# Patient Record
Sex: FEMALE | Race: WHITE | Employment: FULL TIME
[De-identification: names, ages, dates, MRNs, and addresses within clinical notes are randomized per-mention and may not be internally consistent; named-entity substitution may affect disease eponyms.]

---

## 2024-10-14 ENCOUNTER — TELEPHONE (OUTPATIENT)
Dept: FAMILY MEDICINE CLINIC | Facility: CLINIC | Age: 29
End: 2024-10-14

## 2024-10-14 NOTE — TELEPHONE ENCOUNTER
Dfm:    Welcome to [facility]! We can't wait to meet you!          MD Raudel Velasco MD Karla Lester, PA-C Kaitlin Eckley, PA-C     Our care team are here to serve you.    Our office is conveniently located at: [address] [address].    If you have any questions prior to your appointment, please contact our practice manager, Luciana Flores, at [phone].    Sincerely,     [facility]    Stay in touch… Get convenient, quality care at your fingertips!  We know you are busy; use [employer] to manage your health care tasks on your schedule. With our convenient [employer] tommy, you can:    Request or schedule an appointment  Request prescription refills  Communicate with your [facility] provider  Get test results  View health information    Remember to complete the [employer] Visit Pre-Check before your appointment.  It is quick, easy, and will speed up your check-in process when you come to the office.     For more information about our complete line of services within our [facility] system, please visit [facility].  We appreciate you trusting us with your care

## 2024-11-05 SDOH — ECONOMIC STABILITY: FOOD INSECURITY: WITHIN THE PAST 12 MONTHS, YOU WORRIED THAT YOUR FOOD WOULD RUN OUT BEFORE YOU GOT MONEY TO BUY MORE.: NEVER TRUE

## 2024-11-05 SDOH — ECONOMIC STABILITY: FOOD INSECURITY: WITHIN THE PAST 12 MONTHS, THE FOOD YOU BOUGHT JUST DIDN'T LAST AND YOU DIDN'T HAVE MONEY TO GET MORE.: NEVER TRUE

## 2024-11-05 SDOH — ECONOMIC STABILITY: TRANSPORTATION INSECURITY
IN THE PAST 12 MONTHS, HAS LACK OF TRANSPORTATION KEPT YOU FROM MEETINGS, WORK, OR FROM GETTING THINGS NEEDED FOR DAILY LIVING?: YES

## 2024-11-05 SDOH — ECONOMIC STABILITY: INCOME INSECURITY: HOW HARD IS IT FOR YOU TO PAY FOR THE VERY BASICS LIKE FOOD, HOUSING, MEDICAL CARE, AND HEATING?: NOT HARD AT ALL

## 2024-11-08 ENCOUNTER — OFFICE VISIT (OUTPATIENT)
Dept: OBGYN CLINIC | Age: 29
End: 2024-11-08

## 2024-11-08 VITALS
SYSTOLIC BLOOD PRESSURE: 134 MMHG | DIASTOLIC BLOOD PRESSURE: 80 MMHG | WEIGHT: 261 LBS | HEIGHT: 64 IN | BODY MASS INDEX: 44.56 KG/M2

## 2024-11-08 DIAGNOSIS — Z01.419 WELL WOMAN EXAM WITH ROUTINE GYNECOLOGICAL EXAM: Primary | ICD-10-CM

## 2024-11-08 DIAGNOSIS — Z12.4 SCREENING FOR CERVICAL CANCER: ICD-10-CM

## 2024-11-08 DIAGNOSIS — Z11.3 SCREEN FOR STD (SEXUALLY TRANSMITTED DISEASE): ICD-10-CM

## 2024-11-08 RX ORDER — TIRZEPATIDE 7.5 MG/.5ML
INJECTION, SOLUTION SUBCUTANEOUS
COMMUNITY

## 2024-11-08 NOTE — PROGRESS NOTES
Patient presents today for   Chief Complaint   Patient presents with    Annual Exam    New Patient       Menses: Onset:  10 years old. Interval: irregular days. Duration: 8-10 days. Flow: Heavy  LMP: Patient's last menstrual period was 09/29/2024.  Contraception: none        No Known Allergies  Current Outpatient Medications   Medication Sig Dispense Refill    ATORVASTATIN CALCIUM PO       MOUNJARO 7.5 MG/0.5ML SOAJ        No current facility-administered medications for this visit.     Past Medical History:   Diagnosis Date    Kidney stone     Migraine     PCOS (polycystic ovarian syndrome)     Type 2 diabetes mellitus without complication (HCC)      Past Surgical History:   Procedure Laterality Date    COSMETIC SURGERY  2022    monsplasty    TONSILLECTOMY       Social History     Socioeconomic History    Marital status: Single     Spouse name: Not on file    Number of children: Not on file    Years of education: Not on file    Highest education level: Not on file   Occupational History    Not on file   Tobacco Use    Smoking status: Never     Passive exposure: Never    Smokeless tobacco: Never   Vaping Use    Vaping status: Never Used   Substance and Sexual Activity    Alcohol use: Never    Drug use: Never    Sexual activity: Yes     Partners: Male     Birth control/protection: None   Other Topics Concern    Not on file   Social History Narrative    Not on file     Social Determinants of Health     Financial Resource Strain: Not on file   Food Insecurity: Not on file (11/5/2024)   Transportation Needs: Not on file   Physical Activity: Inactive (11/19/2024)    Exercise Vital Sign     Days of Exercise per Week: 0 days     Minutes of Exercise per Session: 0 min   Stress: Not on file   Social Connections: Not on file   Intimate Partner Violence: Not on file   Housing Stability: High Risk (11/5/2024)    Housing Stability Vital Sign     Unable to Pay for Housing in the Last Year: Not on file     Number of Times Moved in

## 2024-11-12 RX ORDER — FLUCONAZOLE 150 MG/1
150 TABLET ORAL
Qty: 2 TABLET | Refills: 0 | Status: SHIPPED | OUTPATIENT
Start: 2024-11-12 | End: 2024-11-18

## 2024-11-18 LAB
C TRACH RRNA CVX QL NAA+PROBE: NEGATIVE
COLLECTION METHOD: ABNORMAL
CYTOLOGIST CVX/VAG CYTO: ABNORMAL
CYTOLOGY CVX/VAG DOC THIN PREP: ABNORMAL
DATE OF LMP: ABNORMAL
HPV REFLEX: ABNORMAL
Lab: ABNORMAL
N GONORRHOEA RRNA CVX QL NAA+PROBE: NEGATIVE
OTHER PT INFO: ABNORMAL
PAP SOURCE: ABNORMAL
PATH REPORT.FINAL DX SPEC: ABNORMAL
PATHOLOGIST CVX/VAG CYTO: ABNORMAL
PATHOLOGIST PROVIDED ICD: ABNORMAL
PREV CYTO INFO: NEGATIVE
PREV TREATMENT RESULTS: ABNORMAL
PREV TREATMENT: ABNORMAL
STAT OF ADQ CVX/VAG CYTO-IMP: ABNORMAL
T VAGINALIS RRNA SPEC QL NAA+PROBE: NEGATIVE

## 2024-12-27 DIAGNOSIS — N89.8 VAGINAL ITCHING: ICD-10-CM

## 2024-12-27 DIAGNOSIS — N89.8 VAGINAL DISCHARGE: Primary | ICD-10-CM

## 2024-12-27 RX ORDER — FLUCONAZOLE 150 MG/1
150 TABLET ORAL
Qty: 2 TABLET | Refills: 0 | Status: SHIPPED | OUTPATIENT
Start: 2024-12-27 | End: 2025-01-02

## 2024-12-27 NOTE — PROGRESS NOTES
Patient reports symptoms of a yeast infection after completing a round of diflucan. Repeat rx sent in. Patient notified that if symptoms are ongoing she will need an office visit.

## 2025-02-10 ENCOUNTER — TELEPHONE (OUTPATIENT)
Dept: OBGYN CLINIC | Age: 30
End: 2025-02-10

## 2025-02-10 NOTE — TELEPHONE ENCOUNTER
Patient calling has appt. This Friday for ultrasound and to see Dr. García.  Patient also has appt. For Colpo next Friday 2/21/25.  She is having trouble getting out of work this Friday and wants to cancel the ultrasound and MD appt.  Can she still have colpo on 2/21 without these appt.'s or will she have to reschedule both?    Please call patient

## 2025-02-21 ENCOUNTER — PROCEDURE VISIT (OUTPATIENT)
Dept: OBGYN CLINIC | Age: 30
End: 2025-02-21
Payer: COMMERCIAL

## 2025-02-21 VITALS
WEIGHT: 260 LBS | SYSTOLIC BLOOD PRESSURE: 120 MMHG | HEIGHT: 64 IN | DIASTOLIC BLOOD PRESSURE: 80 MMHG | BODY MASS INDEX: 44.39 KG/M2

## 2025-02-21 DIAGNOSIS — Z01.812 PRE-PROCEDURE LAB EXAM: ICD-10-CM

## 2025-02-21 DIAGNOSIS — R87.612 LGSIL ON PAP SMEAR OF CERVIX: Primary | ICD-10-CM

## 2025-02-21 LAB
HCG, PREGNANCY, URINE, POC: NEGATIVE
VALID INTERNAL CONTROL, POC: YES

## 2025-02-21 PROCEDURE — 57454 BX/CURETT OF CERVIX W/SCOPE: CPT | Performed by: OBSTETRICS & GYNECOLOGY

## 2025-02-21 PROCEDURE — 81025 URINE PREGNANCY TEST: CPT | Performed by: OBSTETRICS & GYNECOLOGY

## 2025-02-21 RX ORDER — NYSTATIN 100000 U/G
CREAM TOPICAL
COMMUNITY
Start: 2025-01-11

## 2025-02-21 RX ORDER — CLOBETASOL PROPIONATE 0.5 MG/G
OINTMENT TOPICAL
Qty: 45 G | Refills: 3 | Status: SHIPPED | OUTPATIENT
Start: 2025-02-21

## 2025-02-21 RX ORDER — SEMAGLUTIDE 1.34 MG/ML
INJECTION, SOLUTION SUBCUTANEOUS
COMMUNITY
Start: 2024-12-26

## 2025-02-21 RX ORDER — RIMEGEPANT SULFATE 75 MG/75MG
TABLET, ORALLY DISINTEGRATING ORAL
COMMUNITY
Start: 2025-01-12

## 2025-02-21 NOTE — PROGRESS NOTES
Chief Complaint   Patient presents with    Colposcopy       Pap: LGSIL 11/8/2024    Vitals:    02/21/25 1106   BP: 120/80         2/28/2025    10:50 AM 2/21/2025    11:06 AM 11/8/2024    10:52 AM   Weight Metrics   Weight 258 lb 260 lb 261 lb   BMI (Calculated) 44.4 kg/m2 44.7 kg/m2 44.9 kg/m2     Review of Systems   Constitutional: Negative.    HENT: Negative.     Eyes: Negative.    Respiratory: Negative.     Cardiovascular: Negative.    Gastrointestinal: Negative.    Endocrine: Negative.    Genitourinary:         Rash of labia   Musculoskeletal: Negative.    Allergic/Immunologic: Negative.    Neurological: Negative.    Hematological: Negative.    Psychiatric/Behavioral: Negative.       Physical Exam  Vitals signs reviewed.   Constitutional:       General: She is not in acute distress.     Appearance: Normal appearance. She is well-developed. She is not ill-appearing, toxic-appearing or diaphoretic.   HENT:      Head: Normocephalic and atraumatic.      Nose: Nose normal.   Eyes:      General: No scleral icterus.     Conjunctiva/sclera: Conjunctivae normal.      Pupils: Pupils are equal, round, and reactive to light.   Neck:      Musculoskeletal: Normal range of motion. No acute abnormality  Pulmonary:      Effort: Pulmonary effort is normal. No respiratory distress.   Abdominal:      No distension.      Palpations: Abdomen is soft. There is no mass.      Tenderness: There is no abdominal tenderness. There is no guarding or rebound.   Genitourinary:     Labia: Rash present over BL labia majora - red, pruritic, did not improve with yeast Tx     Right: No rash, tenderness, lesion or injury.         Left: No rash, tenderness, lesion or injury.       Vagina: Normal. No signs of injury and foreign body. No vaginal discharge, erythema, tenderness or bleeding.      Cervix: No cervical motion tenderness, discharge or friability.   Musculoskeletal: Normal range of motion.         General: No tenderness.   Skin:     General:

## 2025-02-28 ENCOUNTER — OFFICE VISIT (OUTPATIENT)
Dept: OBGYN CLINIC | Age: 30
End: 2025-02-28

## 2025-02-28 VITALS
WEIGHT: 258 LBS | SYSTOLIC BLOOD PRESSURE: 134 MMHG | HEIGHT: 64 IN | DIASTOLIC BLOOD PRESSURE: 86 MMHG | BODY MASS INDEX: 44.05 KG/M2

## 2025-02-28 DIAGNOSIS — R10.2 PELVIC CRAMPING: ICD-10-CM

## 2025-02-28 DIAGNOSIS — N92.0 MENORRHAGIA WITH REGULAR CYCLE: ICD-10-CM

## 2025-02-28 DIAGNOSIS — Z98.890 HISTORY OF COLPOSCOPY WITH CERVICAL BIOPSY: ICD-10-CM

## 2025-02-28 DIAGNOSIS — N93.9 ABNORMAL UTERINE BLEEDING (AUB): ICD-10-CM

## 2025-02-28 DIAGNOSIS — N87.0 DYSPLASIA OF CERVIX, LOW GRADE (CIN 1): Primary | ICD-10-CM

## 2025-02-28 DIAGNOSIS — R87.612 LGSIL ON PAP SMEAR OF CERVIX: ICD-10-CM

## 2025-02-28 RX ORDER — TRANEXAMIC ACID 650 MG/1
1300 TABLET ORAL 3 TIMES DAILY PRN
Qty: 30 TABLET | Refills: 0 | Status: SHIPPED | OUTPATIENT
Start: 2025-02-28

## 2025-02-28 SDOH — ECONOMIC STABILITY: FOOD INSECURITY: WITHIN THE PAST 12 MONTHS, YOU WORRIED THAT YOUR FOOD WOULD RUN OUT BEFORE YOU GOT MONEY TO BUY MORE.: NEVER TRUE

## 2025-02-28 SDOH — ECONOMIC STABILITY: FOOD INSECURITY: WITHIN THE PAST 12 MONTHS, THE FOOD YOU BOUGHT JUST DIDN'T LAST AND YOU DIDN'T HAVE MONEY TO GET MORE.: NEVER TRUE

## 2025-02-28 NOTE — PROGRESS NOTES
Normocephalic and atraumatic.      Nose: Nose normal.   Eyes:      General: No scleral icterus.     Conjunctiva/sclera: Conjunctivae normal.      Pupils: Pupils are equal, round, and reactive to light.   Neck:      Musculoskeletal: Normal range of motion. No acute abnormality  Pulmonary:      Effort: Pulmonary effort is normal. No respiratory distress.   Abdominal:      No distension.      Palpations: Abdomen is soft. There is no mass.      Tenderness: There is no abdominal tenderness. There is no guarding or rebound.   Genitourinary:     Labia:         Right: No rash, tenderness, lesion or injury.         Left: No rash, tenderness, lesion or injury.       Vagina: Normal. No signs of injury and foreign body. No vaginal discharge, erythema, tenderness. + moderate bleeding.     Cervix: No cervical motion tenderness, discharge or friability.      Comments: External genitalia are normal in appearance. Visual examination of anus and perineum shows no abnormalities.  Musculoskeletal: Normal range of motion.         General: No tenderness.   Skin:     General: Skin is warm and dry.      Coloration: Skin is not pale.      Findings: No erythema or rash.   Neurological:      Mental Status: She is alert and oriented to person, place, and time.      Cranial Nerves: No cranial nerve deficit.      Motor: No abnormal muscle tone.      Coordination: Coordination normal.   Psychiatric:         Behavior: Behavior normal.         Thought Content: Thought content normal.         Judgment: Judgment normal.              1. Dysplasia of cervix, low grade (SHARATH 1)    2. History of colposcopy with cervical biopsy    3. Pelvic cramping    4. Abnormal uterine bleeding (AUB)    5. LGSIL on Pap smear of cervix    6. Menorrhagia with regular cycle      No orders of the defined types were placed in this encounter.    Orders Placed This Encounter   Medications    tranexamic acid (LYSTEDA) 650 MG TABS tablet     Sig: Take 2 tablets by mouth 3 times

## 2025-03-10 ASSESSMENT — ENCOUNTER SYMPTOMS
ALLERGIC/IMMUNOLOGIC NEGATIVE: 1
EYES NEGATIVE: 1
GASTROINTESTINAL NEGATIVE: 1
RESPIRATORY NEGATIVE: 1

## 2025-03-24 ENCOUNTER — PATIENT MESSAGE (OUTPATIENT)
Dept: OBGYN CLINIC | Age: 30
End: 2025-03-24

## 2025-03-24 DIAGNOSIS — N89.8 VAGINAL DISCHARGE: Primary | ICD-10-CM

## 2025-03-24 DIAGNOSIS — N89.8 VAGINAL ITCHING: ICD-10-CM

## 2025-03-24 RX ORDER — METRONIDAZOLE 500 MG/1
500 TABLET ORAL 2 TIMES DAILY
Qty: 14 TABLET | Refills: 0 | Status: SHIPPED | OUTPATIENT
Start: 2025-03-24 | End: 2025-03-31

## 2025-05-09 ENCOUNTER — OFFICE VISIT (OUTPATIENT)
Dept: OBGYN CLINIC | Age: 30
End: 2025-05-09

## 2025-05-09 ENCOUNTER — PROCEDURE VISIT (OUTPATIENT)
Dept: OBGYN CLINIC | Age: 30
End: 2025-05-09
Payer: COMMERCIAL

## 2025-05-09 VITALS
HEIGHT: 64 IN | BODY MASS INDEX: 44.56 KG/M2 | WEIGHT: 261 LBS | SYSTOLIC BLOOD PRESSURE: 122 MMHG | DIASTOLIC BLOOD PRESSURE: 82 MMHG

## 2025-05-09 DIAGNOSIS — N92.0 MENORRHAGIA WITH REGULAR CYCLE: ICD-10-CM

## 2025-05-09 DIAGNOSIS — E28.2 PCOS (POLYCYSTIC OVARIAN SYNDROME): ICD-10-CM

## 2025-05-09 DIAGNOSIS — N93.9 ABNORMAL UTERINE BLEEDING (AUB): Primary | ICD-10-CM

## 2025-05-09 DIAGNOSIS — R10.2 PELVIC CRAMPING: ICD-10-CM

## 2025-05-09 PROCEDURE — 76830 TRANSVAGINAL US NON-OB: CPT | Performed by: OBSTETRICS & GYNECOLOGY

## 2025-05-13 ENCOUNTER — PATIENT MESSAGE (OUTPATIENT)
Dept: OBGYN CLINIC | Age: 30
End: 2025-05-13

## 2025-05-27 NOTE — PROGRESS NOTES
Pt had to leave without being seen by MD after her ultrasound due to our schedule running behind. [facility].

## 2025-05-30 DIAGNOSIS — N93.9 ABNORMAL UTERINE BLEEDING (AUB): ICD-10-CM

## 2025-05-30 DIAGNOSIS — N92.0 MENORRHAGIA WITH REGULAR CYCLE: ICD-10-CM

## 2025-06-02 RX ORDER — TRANEXAMIC ACID 650 MG/1
1300 TABLET ORAL 3 TIMES DAILY PRN
Qty: 30 TABLET | Refills: 0 | Status: SHIPPED | OUTPATIENT
Start: 2025-06-02

## 2025-06-11 ENCOUNTER — OFFICE VISIT (OUTPATIENT)
Dept: OBGYN CLINIC | Age: 30
End: 2025-06-11
Payer: COMMERCIAL

## 2025-06-11 VITALS
HEIGHT: 64 IN | BODY MASS INDEX: 44.22 KG/M2 | SYSTOLIC BLOOD PRESSURE: 124 MMHG | WEIGHT: 259 LBS | DIASTOLIC BLOOD PRESSURE: 80 MMHG

## 2025-06-11 DIAGNOSIS — N93.0 POSTCOITAL BLEEDING: Primary | ICD-10-CM

## 2025-06-11 DIAGNOSIS — N93.9 ABNORMAL UTERINE BLEEDING (AUB): ICD-10-CM

## 2025-06-11 DIAGNOSIS — N92.1 MENORRHAGIA WITH IRREGULAR CYCLE: ICD-10-CM

## 2025-06-11 DIAGNOSIS — N94.6 DYSMENORRHEA: ICD-10-CM

## 2025-06-11 PROCEDURE — 99213 OFFICE O/P EST LOW 20 MIN: CPT | Performed by: NURSE PRACTITIONER

## 2025-06-11 NOTE — PROGRESS NOTES
CC:   Chief Complaint   Patient presents with    Other     Bleeding after intercourse       HPI:    Jose Guadalupe  is a 29 y.o. , , Patient's last menstrual period was 2025 (approximate).,  who is seen today due to post coital vaginal bleeding.     The patient is patient of Dr. García with last visit on 2025. She was also seen for ultrasound on May 9th however, was unable to stay to see Dr. García.    U/s findings 2025: Anteverted, Enlarged Heterogeneous UT (V=100.593)  Endometrium=12.5mm, fluid movement seen within.   Hyperechoic mass with blood flow in cervix measuring 1.1 x 0.6 x 0.8cm, probable polyp. Appears 2.6cm away from external os and surrounded by several Nabothian cysts.   Right ovary-Appears polycystic. 1 simple cyst measuring 3.5 x 2.1 x 2.5cm. Avascular (V=35.209)  Left ovary-appears polycystic (V=15.150)  No free fluid seen  Bilateral adnexas appear wnl      The patient states she started experiencing post coital vaginal bleeding 2-3 weeks ago. She reports experiencing with every encounter. States post coital vaginal bleeding will last 1-4 days. She reports having to wear super tampons changing every 2-3 hours.   She denies noticing bleeding on sheets  Denies changes in positions assist  She denies aggressive or forceful encounters.     She reports most recent menses which started around May 6th (2nd week of May) lasted 11-15 days with flow as follows:     Days 1 and 3: Light flows, wears super tampons, changing every 2-3 hours.  Days 3-8: Heavy Flow, reports wearing Super plus tampon and will also wear \"Depends\". She reports changing every 1-2 hours. Will occasionally bleed onto clothing. Experiences severe dysmenorrhea for which she takes OTC meds as needed.   Days 9-11: Light flow, reports wearing super tampons, changing every 2-3 hours.   Days 12-15: Light flow, reports wearing super tampons, changing every 2-3 hours.     The patient states bleeding will stop for 1-2

## 2025-06-19 ENCOUNTER — TELEPHONE (OUTPATIENT)
Dept: OBGYN CLINIC | Age: 30
End: 2025-06-19

## 2025-06-19 DIAGNOSIS — E28.2 POLYCYSTIC OVARIES: ICD-10-CM

## 2025-06-19 DIAGNOSIS — N84.1 CERVICAL POLYP: Primary | ICD-10-CM

## 2025-06-19 DIAGNOSIS — N85.2 ENLARGED UTERUS: ICD-10-CM

## 2025-06-19 DIAGNOSIS — N93.0 POSTCOITAL BLEEDING: ICD-10-CM

## 2025-06-19 DIAGNOSIS — R87.612 LGSIL ON PAP SMEAR OF CERVIX: ICD-10-CM

## 2025-06-19 DIAGNOSIS — R93.89 ENDOMETRIAL THICKENING ON ULTRASOUND: ICD-10-CM

## 2025-06-19 DIAGNOSIS — N87.0 DYSPLASIA OF CERVIX, LOW GRADE (CIN 1): ICD-10-CM

## 2025-06-19 NOTE — TELEPHONE ENCOUNTER
Called pt to discuss her ultrasound results from last appt.     Ultrasound 5/9/25:  See scanned report for full details.   All images viewed, read and interpreted by this MD.  Anteverted enlarged uterus with 13mm EM.  3cm simple RO cyst, polycystic appearing ovaries BL.   No FF.  1.1cm polyp within the cervix noted, approx 2.6cm above the external os.   Impression/Interpretation: Enlarged uterus with probable cervical polyp and simple right ovarian cyst, overall polycystic appearing ovaries.   Plan: See discussion below, plan surgical eval & Tx with hysteroscopy D&C.  Ciera García MD FACOG      Discussed these findings as interpreted above. Since the ultrasound appt, Jose Guadalupe has developed post-coital bleeding with every time she has intercourse. The polyp seems the most likely answer. Options for removal in office versus more thorough eval in the OR with hysteroscopy, dilation & curettage with polypectomy. She did have an abnormal pap that was low grade and followed with a colposcopy showing low grade dysplasia at multiple biopsy sites. It would make sense to reassess this whiel in the OR as well. Pt requested as soon as possible given that it has been occurring every time she has sex. Will send surgical scheduling request for asap.    1. Cervical polyp    2. Enlarged uterus    3. Polycystic ovaries    4. Endometrial thickening on ultrasound    5. Postcoital bleeding    6. LGSIL on Pap smear of cervix    7. Dysplasia of cervix, low grade (SHARATH 1)

## 2025-06-19 NOTE — TELEPHONE ENCOUNTER
Name: Jose Guadalupe Hall     : 1995    Physicians Information    Recommended Surgery: Hysteroscopy, Dilation and Curettage, polypectomy with myosure, and Colposcopy (if possible)  Place: [facility]   When: TBD  Diagnosis:   Diagnosis Orders   1. Cervical polyp        2. Enlarged uterus        3. Polycystic ovaries        4. Endometrial thickening on ultrasound        5. Postcoital bleeding        6. LGSIL on Pap smear of cervix        7. Dysplasia of cervix, low grade (SHARATH 1)          Time Needed:  Surgeon:Ciera García MD  Assistant Needed: aNti Carmona CST  Special Request:    Surgery Department Information    Date Scheduled: _____________________ Hospital Pre-Op:______________________        Time Scheduled : ____________________ Surgery Entered: _____________________    Facility: ____________________________ Patient Notified: ______________________    Pre-Op: _______________________ Orders Completed: ___________________

## 2025-06-20 ENCOUNTER — TELEPHONE (OUTPATIENT)
Dept: OBGYN CLINIC | Age: 30
End: 2025-06-20

## 2025-06-20 ENCOUNTER — PREP FOR PROCEDURE (OUTPATIENT)
Dept: OBGYN CLINIC | Age: 30
End: 2025-06-20

## 2025-06-20 DIAGNOSIS — R93.89 THICKENED ENDOMETRIUM: ICD-10-CM

## 2025-06-20 DIAGNOSIS — N84.1 POLYP OF CERVIX: ICD-10-CM

## 2025-06-20 DIAGNOSIS — N93.0 POSTCOITAL BLEEDING: ICD-10-CM

## 2025-06-20 DIAGNOSIS — N85.2 ENLARGED UTERUS: ICD-10-CM

## 2025-06-20 DIAGNOSIS — N87.0 CIN I (CERVICAL INTRAEPITHELIAL NEOPLASIA I): ICD-10-CM

## 2025-06-20 DIAGNOSIS — R87.612 LOW-GRADE SQUAMOUS INTRAEPITHELIAL LESION OF CERVIX OF UNDETERMINED SIGNIFICANCE: ICD-10-CM

## 2025-06-20 NOTE — TELEPHONE ENCOUNTER
Attempted to contact patient to discuss possible dates for recommended surgery per Dr García (Hyst D&C with Myosure possible Colposcopy). N/A, L/M on voicemail for patient to return my call at her earliest convenience.

## 2025-06-23 RX ORDER — LISINOPRIL 2.5 MG/1
2.5 TABLET ORAL DAILY
COMMUNITY

## 2025-06-23 NOTE — PROGRESS NOTES
Patient verified name and .  Order for consent  was not found in EHR   Type 1b surgery, Phone assessment complete.  Orders not received.  Labs per surgeon: none  Labs per anesthesia protocol: none    Patient answered medical/surgical history questions at their best of ability. All prior to admission medications documented in EPIC.    Patient instructed to continue taking all prescription medications up to the day of surgery but to take only the following medications the day of surgery according to anesthesia guidelines with a small sip of water: ATORVASTATIN.  Also, patient is requested to take 2 Tylenol in the morning and then again before bed on the day before surgery. Regular or extra strength may be used.       Patient informed that all vitamins and supplements should be held 7 days prior to surgery and NSAIDS 5 days prior to surgery. Prescription meds to hold:NONE    Patient instructed on the following:    > Arrive at [facility] A Entrance, time of arrival to be called the day before by 1700  > No food after midnight, patient may drink clear liquids up until 2 hours prior to arrival. No gum, candy, mints.   > Responsible adult must drive patient to the hospital, stay during surgery, and patient will need supervision 24 hours after anesthesia  > Use non moisturizing soap in shower the night before surgery and on the morning of surgery  > All piercings must be removed prior to arrival.    > Leave all valuables (money and jewelry) at home but bring insurance card and ID on DOS.   > You may be required to pay a deductible or co-pay on the day of your procedure. You can pre-pay by calling [phone] if your surgery is at the [facility] or [phone] if your surgery is at the [facility].  > Do not wear make-up, nail polish, lotions, cologne, perfumes, powders, or oil on skin. Artificial nails are not permitted.

## 2025-07-07 ENCOUNTER — PREP FOR PROCEDURE (OUTPATIENT)
Dept: OBGYN CLINIC | Age: 30
End: 2025-07-07

## 2025-07-07 DIAGNOSIS — N85.2 ENLARGED UTERUS: ICD-10-CM

## 2025-07-07 DIAGNOSIS — N87.0 CIN I (CERVICAL INTRAEPITHELIAL NEOPLASIA I): ICD-10-CM

## 2025-07-07 DIAGNOSIS — N93.9 ABNORMAL UTERINE BLEEDING (AUB): ICD-10-CM

## 2025-07-07 DIAGNOSIS — R93.89 THICKENED ENDOMETRIUM: ICD-10-CM

## 2025-07-07 DIAGNOSIS — N92.1 MENORRHAGIA WITH IRREGULAR CYCLE: ICD-10-CM

## 2025-07-07 DIAGNOSIS — N94.6 DYSMENORRHEA: ICD-10-CM

## 2025-07-07 DIAGNOSIS — N93.0 POSTCOITAL BLEEDING: Primary | ICD-10-CM

## 2025-07-07 RX ORDER — SODIUM CHLORIDE 0.9 % (FLUSH) 0.9 %
5-40 SYRINGE (ML) INJECTION EVERY 12 HOURS SCHEDULED
Status: CANCELLED | OUTPATIENT
Start: 2025-07-08

## 2025-07-07 RX ORDER — SODIUM CHLORIDE 9 MG/ML
INJECTION, SOLUTION INTRAVENOUS PRN
Status: CANCELLED | OUTPATIENT
Start: 2025-07-07

## 2025-07-07 RX ORDER — SODIUM CHLORIDE 0.9 % (FLUSH) 0.9 %
5-40 SYRINGE (ML) INJECTION PRN
Status: CANCELLED | OUTPATIENT
Start: 2025-07-07

## 2025-07-08 ENCOUNTER — ANESTHESIA EVENT (OUTPATIENT)
Dept: SURGERY | Age: 30
End: 2025-07-08
Payer: COMMERCIAL

## 2025-07-08 ENCOUNTER — HOSPITAL ENCOUNTER (OUTPATIENT)
Age: 30
Discharge: HOME | End: 2025-07-08
Attending: OBSTETRICS & GYNECOLOGY | Admitting: OBSTETRICS & GYNECOLOGY
Payer: COMMERCIAL

## 2025-07-08 ENCOUNTER — ANESTHESIA (OUTPATIENT)
Dept: SURGERY | Age: 30
End: 2025-07-08
Payer: COMMERCIAL

## 2025-07-08 VITALS
BODY MASS INDEX: 44.44 KG/M2 | HEIGHT: 64 IN | RESPIRATION RATE: 15 BRPM | HEART RATE: 90 BPM | WEIGHT: 260.3 LBS | DIASTOLIC BLOOD PRESSURE: 89 MMHG | TEMPERATURE: 98 F | OXYGEN SATURATION: 97 % | SYSTOLIC BLOOD PRESSURE: 146 MMHG

## 2025-07-08 DIAGNOSIS — Z98.890 POST-OPERATIVE STATE: Primary | ICD-10-CM

## 2025-07-08 LAB
GLUCOSE BLD STRIP.AUTO-MCNC: 153 MG/DL (ref 65–100)
HCG UR QL: NEGATIVE
HGB BLD-MCNC: 14.3 G/DL (ref 11.7–15.4)
SERVICE CMNT-IMP: ABNORMAL

## 2025-07-08 PROCEDURE — 88305 TISSUE EXAM BY PATHOLOGIST: CPT

## 2025-07-08 PROCEDURE — 82962 GLUCOSE BLOOD TEST: CPT

## 2025-07-08 PROCEDURE — 7100000010 HC PHASE II RECOVERY - FIRST 15 MIN: Performed by: OBSTETRICS & GYNECOLOGY

## 2025-07-08 PROCEDURE — 3600000013 HC SURGERY LEVEL 3 ADDTL 15MIN: Performed by: OBSTETRICS & GYNECOLOGY

## 2025-07-08 PROCEDURE — 7100000000 HC PACU RECOVERY - FIRST 15 MIN: Performed by: OBSTETRICS & GYNECOLOGY

## 2025-07-08 PROCEDURE — 85018 HEMOGLOBIN: CPT

## 2025-07-08 PROCEDURE — 3700000000 HC ANESTHESIA ATTENDED CARE: Performed by: OBSTETRICS & GYNECOLOGY

## 2025-07-08 PROCEDURE — 3600000003 HC SURGERY LEVEL 3 BASE: Performed by: OBSTETRICS & GYNECOLOGY

## 2025-07-08 PROCEDURE — 81025 URINE PREGNANCY TEST: CPT

## 2025-07-08 PROCEDURE — 7100000001 HC PACU RECOVERY - ADDTL 15 MIN: Performed by: OBSTETRICS & GYNECOLOGY

## 2025-07-08 PROCEDURE — 2500000003 HC RX 250 WO HCPCS: Performed by: NURSE ANESTHETIST, CERTIFIED REGISTERED

## 2025-07-08 PROCEDURE — 7100000011 HC PHASE II RECOVERY - ADDTL 15 MIN: Performed by: OBSTETRICS & GYNECOLOGY

## 2025-07-08 PROCEDURE — 2580000003 HC RX 258: Performed by: ANESTHESIOLOGY

## 2025-07-08 PROCEDURE — 6360000002 HC RX W HCPCS: Performed by: ANESTHESIOLOGY

## 2025-07-08 PROCEDURE — 2720000010 HC SURG SUPPLY STERILE: Performed by: OBSTETRICS & GYNECOLOGY

## 2025-07-08 PROCEDURE — 3700000001 HC ADD 15 MINUTES (ANESTHESIA): Performed by: OBSTETRICS & GYNECOLOGY

## 2025-07-08 PROCEDURE — 6360000002 HC RX W HCPCS: Performed by: NURSE ANESTHETIST, CERTIFIED REGISTERED

## 2025-07-08 PROCEDURE — 2709999900 HC NON-CHARGEABLE SUPPLY: Performed by: OBSTETRICS & GYNECOLOGY

## 2025-07-08 PROCEDURE — 6370000000 HC RX 637 (ALT 250 FOR IP): Performed by: ANESTHESIOLOGY

## 2025-07-08 PROCEDURE — 2580000003 HC RX 258: Performed by: NURSE ANESTHETIST, CERTIFIED REGISTERED

## 2025-07-08 RX ORDER — DEXAMETHASONE SODIUM PHOSPHATE 4 MG/ML
INJECTION, SOLUTION INTRA-ARTICULAR; INTRALESIONAL; INTRAMUSCULAR; INTRAVENOUS; SOFT TISSUE
Status: DISCONTINUED | OUTPATIENT
Start: 2025-07-08 | End: 2025-07-08 | Stop reason: SDUPTHER

## 2025-07-08 RX ORDER — MIDAZOLAM HYDROCHLORIDE 2 MG/2ML
2 INJECTION, SOLUTION INTRAMUSCULAR; INTRAVENOUS
Status: DISCONTINUED | OUTPATIENT
Start: 2025-07-08 | End: 2025-07-08 | Stop reason: HOSPADM

## 2025-07-08 RX ORDER — SUCCINYLCHOLINE CHLORIDE 20 MG/ML
INJECTION INTRAMUSCULAR; INTRAVENOUS
Status: DISCONTINUED | OUTPATIENT
Start: 2025-07-08 | End: 2025-07-08 | Stop reason: SDUPTHER

## 2025-07-08 RX ORDER — IBUPROFEN 600 MG/1
600 TABLET, FILM COATED ORAL 3 TIMES DAILY PRN
Qty: 30 TABLET | Refills: 0 | Status: SHIPPED | OUTPATIENT
Start: 2025-07-08

## 2025-07-08 RX ORDER — SODIUM CHLORIDE 0.9 % (FLUSH) 0.9 %
5-40 SYRINGE (ML) INJECTION EVERY 12 HOURS SCHEDULED
Status: DISCONTINUED | OUTPATIENT
Start: 2025-07-08 | End: 2025-07-08 | Stop reason: HOSPADM

## 2025-07-08 RX ORDER — LIDOCAINE HYDROCHLORIDE 10 MG/ML
1 INJECTION, SOLUTION INFILTRATION; PERINEURAL
Status: COMPLETED | OUTPATIENT
Start: 2025-07-08 | End: 2025-07-08

## 2025-07-08 RX ORDER — SODIUM CHLORIDE, SODIUM LACTATE, POTASSIUM CHLORIDE, CALCIUM CHLORIDE 600; 310; 30; 20 MG/100ML; MG/100ML; MG/100ML; MG/100ML
INJECTION, SOLUTION INTRAVENOUS
Status: DISCONTINUED | OUTPATIENT
Start: 2025-07-08 | End: 2025-07-08 | Stop reason: SDUPTHER

## 2025-07-08 RX ORDER — OXYCODONE HYDROCHLORIDE 5 MG/1
5 TABLET ORAL
Status: DISCONTINUED | OUTPATIENT
Start: 2025-07-08 | End: 2025-07-08 | Stop reason: HOSPADM

## 2025-07-08 RX ORDER — SODIUM CHLORIDE 9 MG/ML
INJECTION, SOLUTION INTRAVENOUS PRN
Status: DISCONTINUED | OUTPATIENT
Start: 2025-07-08 | End: 2025-07-08 | Stop reason: HOSPADM

## 2025-07-08 RX ORDER — OXYCODONE HYDROCHLORIDE 5 MG/1
5 TABLET ORAL EVERY 6 HOURS PRN
Qty: 12 TABLET | Refills: 0 | Status: SHIPPED | OUTPATIENT
Start: 2025-07-08 | End: 2025-07-11

## 2025-07-08 RX ORDER — EPHEDRINE SULFATE/0.9% NACL/PF 50 MG/5 ML
SYRINGE (ML) INTRAVENOUS
Status: DISCONTINUED | OUTPATIENT
Start: 2025-07-08 | End: 2025-07-08 | Stop reason: SDUPTHER

## 2025-07-08 RX ORDER — FENTANYL CITRATE 50 UG/ML
INJECTION, SOLUTION INTRAMUSCULAR; INTRAVENOUS
Status: DISCONTINUED | OUTPATIENT
Start: 2025-07-08 | End: 2025-07-08 | Stop reason: SDUPTHER

## 2025-07-08 RX ORDER — SODIUM CHLORIDE, SODIUM LACTATE, POTASSIUM CHLORIDE, CALCIUM CHLORIDE 600; 310; 30; 20 MG/100ML; MG/100ML; MG/100ML; MG/100ML
INJECTION, SOLUTION INTRAVENOUS CONTINUOUS
Status: DISCONTINUED | OUTPATIENT
Start: 2025-07-08 | End: 2025-07-08 | Stop reason: HOSPADM

## 2025-07-08 RX ORDER — SODIUM CHLORIDE 0.9 % (FLUSH) 0.9 %
5-40 SYRINGE (ML) INJECTION PRN
Status: DISCONTINUED | OUTPATIENT
Start: 2025-07-08 | End: 2025-07-08 | Stop reason: HOSPADM

## 2025-07-08 RX ORDER — PROCHLORPERAZINE EDISYLATE 5 MG/ML
5 INJECTION INTRAMUSCULAR; INTRAVENOUS
Status: COMPLETED | OUTPATIENT
Start: 2025-07-08 | End: 2025-07-08

## 2025-07-08 RX ORDER — ACETAMINOPHEN 500 MG
1000 TABLET ORAL ONCE
Status: COMPLETED | OUTPATIENT
Start: 2025-07-08 | End: 2025-07-08

## 2025-07-08 RX ORDER — LIDOCAINE HYDROCHLORIDE 20 MG/ML
INJECTION, SOLUTION EPIDURAL; INFILTRATION; INTRACAUDAL; PERINEURAL
Status: DISCONTINUED | OUTPATIENT
Start: 2025-07-08 | End: 2025-07-08 | Stop reason: SDUPTHER

## 2025-07-08 RX ORDER — ONDANSETRON 2 MG/ML
INJECTION INTRAMUSCULAR; INTRAVENOUS
Status: DISCONTINUED | OUTPATIENT
Start: 2025-07-08 | End: 2025-07-08 | Stop reason: SDUPTHER

## 2025-07-08 RX ORDER — PROPOFOL 10 MG/ML
INJECTION, EMULSION INTRAVENOUS
Status: DISCONTINUED | OUTPATIENT
Start: 2025-07-08 | End: 2025-07-08 | Stop reason: SDUPTHER

## 2025-07-08 RX ORDER — ROCURONIUM BROMIDE 10 MG/ML
INJECTION, SOLUTION INTRAVENOUS
Status: DISCONTINUED | OUTPATIENT
Start: 2025-07-08 | End: 2025-07-08 | Stop reason: SDUPTHER

## 2025-07-08 RX ADMIN — ROCURONIUM BROMIDE 5 MG: 10 INJECTION, SOLUTION INTRAVENOUS at 07:19

## 2025-07-08 RX ADMIN — ONDANSETRON 4 MG: 2 INJECTION INTRAMUSCULAR; INTRAVENOUS at 07:22

## 2025-07-08 RX ADMIN — SODIUM CHLORIDE, SODIUM LACTATE, POTASSIUM CHLORIDE, AND CALCIUM CHLORIDE: .6; .31; .03; .02 INJECTION, SOLUTION INTRAVENOUS at 06:08

## 2025-07-08 RX ADMIN — ACETAMINOPHEN 1000 MG: 500 TABLET, FILM COATED ORAL at 05:58

## 2025-07-08 RX ADMIN — DEXAMETHASONE SODIUM PHOSPHATE 4 MG: 4 INJECTION INTRA-ARTICULAR; INTRALESIONAL; INTRAMUSCULAR; INTRAVENOUS; SOFT TISSUE at 07:22

## 2025-07-08 RX ADMIN — Medication 160 MG: at 07:19

## 2025-07-08 RX ADMIN — FENTANYL CITRATE 50 MCG: 50 INJECTION, SOLUTION INTRAMUSCULAR; INTRAVENOUS at 07:17

## 2025-07-08 RX ADMIN — LIDOCAINE HYDROCHLORIDE 1 ML: 10 INJECTION, SOLUTION INFILTRATION; PERINEURAL at 06:08

## 2025-07-08 RX ADMIN — SODIUM CHLORIDE, SODIUM LACTATE, POTASSIUM CHLORIDE, AND CALCIUM CHLORIDE: 600; 310; 30; 20 INJECTION, SOLUTION INTRAVENOUS at 07:11

## 2025-07-08 RX ADMIN — FENTANYL CITRATE 50 MCG: 50 INJECTION, SOLUTION INTRAMUSCULAR; INTRAVENOUS at 07:14

## 2025-07-08 RX ADMIN — PROCHLORPERAZINE EDISYLATE 5 MG: 5 INJECTION INTRAMUSCULAR; INTRAVENOUS at 09:07

## 2025-07-08 RX ADMIN — Medication 5 MG: at 07:49

## 2025-07-08 RX ADMIN — LIDOCAINE HYDROCHLORIDE 60 MG: 20 INJECTION, SOLUTION EPIDURAL; INFILTRATION; INTRACAUDAL; PERINEURAL at 07:19

## 2025-07-08 RX ADMIN — PROPOFOL 200 MG: 10 INJECTION, EMULSION INTRAVENOUS at 07:19

## 2025-07-08 RX ADMIN — FENTANYL CITRATE 25 MCG: 50 INJECTION, SOLUTION INTRAMUSCULAR; INTRAVENOUS at 07:37

## 2025-07-08 ASSESSMENT — PAIN - FUNCTIONAL ASSESSMENT: PAIN_FUNCTIONAL_ASSESSMENT: 0-10

## 2025-07-08 NOTE — INTERVAL H&P NOTE
Update History & Physical    The patient's History and Physical was reviewed with the patient and I examined the patient. There was no change. The surgical site was confirmed by the patient and me.     Plan: The risks, benefits, expected outcome, and alternative to the recommended procedure have been discussed with the patient. Patient understands and wants to proceed with the procedure.     Electronically signed by Ciera García MD on 7/8/2025 at 6:54 AM

## 2025-07-08 NOTE — ANESTHESIA POSTPROCEDURE EVALUATION
Department of Anesthesiology  Postprocedure Note    Patient: Jose Guadalupe Hall  MRN: [id number]  YOB: 1995  Date of evaluation: 7/8/2025    Procedure Summary       Date: 07/08/25 Room / Location: [facility] MAIN OR 02 / [facility] MAIN OR    Anesthesia Start: 0657 Anesthesia Stop: 0825    Procedure: HYSTEROSCOPY DILATATION CURETTAGE WITH MYOSURE AND REMOVAL OF RIGHT VULVAR SKIN TAG (Vagina ) Diagnosis:       Polyp of cervix      Enlarged uterus      Thickened endometrium      Postcoital bleeding      Low-grade squamous intraepithelial lesion of cervix of undetermined significance      SHARATH I (cervical intraepithelial neoplasia I)      (Polyp of cervix [N84.1])      (Enlarged uterus [N85.2])      (Thickened endometrium [R93.89])      (Postcoital bleeding [N93.0])      (Low-grade squamous intraepithelial lesion of cervix of undetermined significance [R87.612])      (SHARATH I (cervical intraepithelial neoplasia I) [N87.0])    Surgeons: Ciera García MD Responsible Provider: Delonte Holley MD    Anesthesia Type: general ASA Status: 3            Anesthesia Type: No value filed.    Emil Phase I: Emil Score: 10    Emil Phase II:      Anesthesia Post Evaluation    Patient location during evaluation: PACU  Patient participation: complete - patient participated  Level of consciousness: awake and alert  Airway patency: patent  Nausea & Vomiting: no nausea and no vomiting  Cardiovascular status: hemodynamically stable  Respiratory status: acceptable, nonlabored ventilation and spontaneous ventilation  Hydration status: euvolemic  Comments: BP (!) 146/90   Pulse 87   Temp 97.9 °F (36.6 °C)   Resp 15   Ht 1.626 m (5' 4\")   Wt 118.1 kg (260 lb 4.8 oz)   LMP 05/06/2025 (Approximate)   SpO2 96%   BMI 44.68 kg/m²     Multimodal analgesia pain management approach  Pain management: adequate and satisfactory to patient    No notable events documented.

## 2025-07-08 NOTE — ANESTHESIA PRE PROCEDURE
Anesthesia Plan      general     ASA 3     (GETA)  Induction: intravenous.      Anesthetic plan and risks discussed with patient.                    INGRID MENDEZ MD   7/8/2025

## 2025-07-08 NOTE — OP NOTE
Operative Note      Patient: Jose Guadalupe Hall  YOB: 1995  MRN: [id number]    Date of Procedure: 7/8/2025    Pre-Op Diagnosis Codes:      * Polyp of cervix [N84.1]     * Enlarged uterus [N85.2]     * Thickened endometrium [R93.89]     * Postcoital bleeding [N93.0]     * Low-grade squamous intraepithelial lesion of cervix of undetermined significance [R87.612]     * SHARATH I (cervical intraepithelial neoplasia I) [N87.0]     * Dysmenorrhea     * Menorrhagia    Post-Op Diagnosis: Same + probable endometrial polyps + right vulvar skin tag + probable arcuate uterus       Procedure(s):  HYSTEROSCOPY DILATATION CURETTAGE WITH MYOSURE AND REMOVAL OF RIGHT VULVAR SKIN TAG    Surgeon(s):  Ciera García MD    Assistant:   Nati Carmona CST    Anesthesia: General    Estimated Blood Loss (mL): Minimal    Complications: None    Specimens:   ID Type Source Tests Collected by Time Destination   A : endometrial currettings Tissue Endometrium SURGICAL PATHOLOGY Ciera García MD 7/8/2025 0802    B : right vulvar skin tag Tissue Vulva SURGICAL PATHOLOGY Ciera García MD 7/8/2025 0805        Indications for Surgery: 30yo G0 with persistent heavy, irregular periods with prolonged bleeding for months at a time and recently has developed postcoital bleeding (known mild cervical dysplasia that is due to have follow up evaluation in November 2025). Pt has had to take Lysteda on and off for this to reduce her flow. Her BMI of 44.68 places her at a higher risk of endometrial cancer and due to her enlarged uterus with thickened endometrial lining and concern for cervical versus endometrial polyps, recommend proceeding with surgical eval and tx as planned today. Risks, benefits, and alternatives discussed. Pt desired to proceed as planned. Informed consent was obtained prior to proceeding to the operating room.     Findings:  Infection Present At Time Of Surgery (PATOS) (choose all levels that have infection

## 2025-07-08 NOTE — DISCHARGE INSTRUCTIONS
INSTRUCTIONS FOLLOWING HYSTEROSCOPY    ACTIVITY   Limited activity today; increase as tolerated tomorrow, but no vigorous exercise   Shower only; no tub baths   No douches, tampons or intercourse until your doctor releases you (at least 2 weeks)   May return to work or school as directed by your doctor      You will probably have bloody discharge (like a period) for 1-2 days, then watery discharge for another 7 days.    You will want to use a perineal pad, not tampons for this.    DIET   Clear liquids until no nausea or vomiting   Advance to regular diet as tolerated       PAIN   Expect a moderate amount of pain.   Take pain medication as directed by your doctor. If no prescription for pain is sent         home with you, take the appropriate dose of your commonly used pain medication.   Call your doctor if pain is NOT relieved by medication.   DO NOT take aspirin or blood thinners until directed by your doctor.      FOLLOW-UP PHONE CALLS     If you have any problems, call your doctor as needed.    CALL YOUR DOCTOR IF   Excessive bleeding that soaks a pad in an hour   Temperature of 101°F or above   Green or yellow, smelly discharge   Unable to urinate by bedtime   Nausea and vomiting that does not stop by bedtime    AFTER ANESTHESIA   For the first 24 hours: DO NOT Drive, Drink alcoholic beverages, or Make important decisions.   Beware of dizziness following anesthesia and while taking pain medication.   Plan to stay tonight within 1 hour’s drive of the hospital

## 2025-07-08 NOTE — PERIOP NOTE
MD Holley  at bedside with patient. Pt VSS stable. Pain and Nausea controlled at this time. Verbal sign out per MD when pacu care is completed. Plan of care continues.

## 2025-07-08 NOTE — H&P (VIEW-ONLY)
Gynecology History and Physical    Name: Jose Guadalupe Hall MRN: [id number] SSN: [id number]    YOB: 1995  Age: 29 y.o.  Sex: female       Subjective:      Chief complaint:  Abnormal uterine bleeding, Dysmenorrhea, and Menorrhagia    Jose Guadalupe is a 29 y.o. G0 female with a history of AUB, heavy and irregular bleeding with painful periods who presents for gyn surgery. Past imaging demonstrated an enlarged uterus, thickened endometrium and she has a hx of a cervical polyp as well. Pt had LGSIL pap last November and was found to have SHARATH 1 at 5:00, 8:00 & 10:00 in 2025.    Planned surgery: Hysteroscopy, D&C with myosure, possible colposcopy.    OB History          0    Para   0    Term   0       0    AB   0    Living   0         SAB   0    IAB   0    Ectopic   0    Molar   0    Multiple   0    Live Births   0              Past Medical History:   Diagnosis Date    Elevated cholesterol     Kidney stone     Migraine     PCOS (polycystic ovarian syndrome)     Type 2 diabetes mellitus without complication (HCC)     FBG ranges ( 110s )     Past Surgical History:   Procedure Laterality Date    COSMETIC SURGERY      monsplasty    TONSILLECTOMY       Social History     Occupational History    Not on file   Tobacco Use    Smoking status: Never     Passive exposure: Never    Smokeless tobacco: Never   Vaping Use    Vaping status: Never Used   Substance and Sexual Activity    Alcohol use: Never    Drug use: Never    Sexual activity: Yes     Partners: Male     Birth control/protection: None     Family History   Problem Relation Age of Onset    Hypertension Father     Hypertension Mother     Diabetes Mother     Breast Cancer Neg Hx     Ovarian Cancer Neg Hx         No Known Allergies  Prior to Admission medications    Medication Sig Start Date End Date Taking? Authorizing Provider   lisinopril (PRINIVIL;ZESTRIL) 2.5 MG tablet Take 1 tablet by mouth daily    ProviderColeman MD   Probiotic Product  range of motion and neck supple.   Skin:     General: Skin is warm and dry.      Coloration: Skin is not jaundiced or pale.      Findings: No erythema or rash.   Neurological:      General: No focal deficit present.      Mental Status: She is alert, oriented to person, place, and time and easily aroused.      Motor: No abnormal muscle tone.      Coordination: Coordination normal.   Psychiatric:         Mood and Affect: Mood normal.         Speech: Speech normal.         Behavior: Behavior normal. Behavior is cooperative.         Thought Content: Thought content normal.         Judgment: Judgment normal.       Assessment:     Patient Active Problem List    Diagnosis Date Noted    Menorrhagia with irregular cycle 07/07/2025    Dysmenorrhea 07/07/2025    Abnormal uterine bleeding (AUB) 07/07/2025    Enlarged uterus 06/20/2025    Thickened endometrium 06/20/2025    Postcoital bleeding 06/20/2025    SHARATH I (cervical intraepithelial neoplasia I) 06/20/2025        Plan:     Discussed the risks of surgery including the risks of bleeding, infection, deep vein thrombosis, and surgical injuries to internal organs including but not limited to the bowels, bladder, rectum, and female reproductive organs. The patient understands the risks; any and all questions were answered to the patient's satisfaction. Plan to proceed with HSC, D&C with myosure and possible colposcopy as planned.

## 2025-07-15 PROBLEM — N90.89 VULVAR LESION: Status: ACTIVE | Noted: 2025-07-15

## 2025-07-23 ENCOUNTER — OFFICE VISIT (OUTPATIENT)
Dept: OBGYN CLINIC | Age: 30
End: 2025-07-23

## 2025-07-23 VITALS
SYSTOLIC BLOOD PRESSURE: 128 MMHG | HEIGHT: 64 IN | WEIGHT: 264 LBS | BODY MASS INDEX: 45.07 KG/M2 | DIASTOLIC BLOOD PRESSURE: 70 MMHG

## 2025-07-23 DIAGNOSIS — N85.02 COMPLEX ATYPICAL ENDOMETRIAL HYPERPLASIA: ICD-10-CM

## 2025-07-23 DIAGNOSIS — N92.1 MENORRHAGIA WITH IRREGULAR CYCLE: Primary | ICD-10-CM

## 2025-07-23 DIAGNOSIS — N84.0 ENDOMETRIAL POLYP: ICD-10-CM

## 2025-07-23 PROCEDURE — 99024 POSTOP FOLLOW-UP VISIT: CPT | Performed by: OBSTETRICS & GYNECOLOGY

## 2025-08-25 DIAGNOSIS — N85.2 ENLARGED UTERUS: Primary | ICD-10-CM

## (undated) DEVICE — FLUID MGMT SYS FLUENT KIT 6/PK

## (undated) DEVICE — CARDINAL HEALTH FLEXIBLE LIGHT HANDLE COVER: Brand: CARDINAL HEALTH

## (undated) DEVICE — SOLUTION IRRIG 1000ML H2O PIC PLAS SHATTERPROOF CONTAINER

## (undated) DEVICE — DEVICE TISS REM L32CM DIA3MM S STL ULT HRD HI WR RESISTANCE

## (undated) DEVICE — GLOVE SURG SZ 65 THK91MIL LTX FREE SYN POLYISOPRENE

## (undated) DEVICE — SOLUTION IRRIG 3000ML 0.9% SOD CHL USP UROMATIC PLAS CONT

## (undated) DEVICE — MINOR LITHOTOMY PACK: Brand: MEDLINE INDUSTRIES, INC.

## (undated) DEVICE — SET ENDOSCP SEAL HYSTEROSCOPE RIG OUTFLO CHN DISP MYOSURE